# Patient Record
Sex: FEMALE | Race: BLACK OR AFRICAN AMERICAN | Employment: FULL TIME | URBAN - METROPOLITAN AREA
[De-identification: names, ages, dates, MRNs, and addresses within clinical notes are randomized per-mention and may not be internally consistent; named-entity substitution may affect disease eponyms.]

---

## 2020-05-23 ENCOUNTER — ED HISTORICAL/CONVERTED ENCOUNTER (OUTPATIENT)
Dept: OTHER | Age: 50
End: 2020-05-23

## 2020-08-11 ENCOUNTER — TELEPHONE (OUTPATIENT)
Dept: ENT CLINIC | Age: 50
End: 2020-08-11

## 2020-08-13 DIAGNOSIS — R59.0 CERVICAL LYMPHADENOPATHY: Primary | ICD-10-CM

## 2021-02-15 PROBLEM — Z87.42 HISTORY OF ENDOMETRIOSIS: Status: ACTIVE | Noted: 2021-02-15

## 2021-02-15 PROBLEM — B20 HUMAN IMMUNODEFICIENCY VIRUS (HCC): Status: ACTIVE | Noted: 2021-02-15

## 2021-02-15 RX ORDER — ERGOCALCIFEROL 1.25 MG/1
50000 CAPSULE ORAL
COMMUNITY

## 2021-06-29 ENCOUNTER — HOSPITAL ENCOUNTER (EMERGENCY)
Age: 51
Discharge: HOME | End: 2021-06-30
Attending: EMERGENCY MEDICINE
Payer: COMMERCIAL

## 2021-06-29 DIAGNOSIS — M54.2 NECK PAIN: Primary | ICD-10-CM

## 2021-06-29 DIAGNOSIS — N39.0 URINARY TRACT INFECTION WITHOUT HEMATURIA, SITE UNSPECIFIED: ICD-10-CM

## 2021-06-29 PROCEDURE — 74011250637 HC RX REV CODE- 250/637: Performed by: EMERGENCY MEDICINE

## 2021-06-29 PROCEDURE — 81001 URINALYSIS AUTO W/SCOPE: CPT

## 2021-06-29 PROCEDURE — 99284 EMERGENCY DEPT VISIT MOD MDM: CPT

## 2021-06-29 RX ORDER — METOPROLOL TARTRATE 50 MG/1
50 TABLET ORAL
Status: COMPLETED | OUTPATIENT
Start: 2021-06-29 | End: 2021-06-29

## 2021-06-29 RX ORDER — LORAZEPAM 1 MG/1
1 TABLET ORAL
COMMUNITY

## 2021-06-29 RX ORDER — CYCLOBENZAPRINE HCL 10 MG
10 TABLET ORAL
Status: COMPLETED | OUTPATIENT
Start: 2021-06-29 | End: 2021-06-29

## 2021-06-29 RX ORDER — IBUPROFEN 800 MG/1
800 TABLET ORAL
Status: COMPLETED | OUTPATIENT
Start: 2021-06-29 | End: 2021-06-29

## 2021-06-29 RX ORDER — FLUOXETINE HYDROCHLORIDE 20 MG/1
CAPSULE ORAL
COMMUNITY
Start: 2020-10-26

## 2021-06-29 RX ADMIN — IBUPROFEN 800 MG: 800 TABLET, FILM COATED ORAL at 23:59

## 2021-06-29 RX ADMIN — CYCLOBENZAPRINE HYDROCHLORIDE 10 MG: 10 TABLET, FILM COATED ORAL at 23:59

## 2021-06-29 RX ADMIN — METOPROLOL TARTRATE 50 MG: 50 TABLET, FILM COATED ORAL at 23:59

## 2021-06-29 NOTE — Clinical Note
[address]  [facility] EMERGENCY DEPT  [address] [id number]  [phone]    Work/School Note    Date: 6/29/2021    To Whom It May concern:    Karen Plaza was seen and treated today in the emergency room by the following provider(s):  Attending Provider: Ty Leung MD.      Karen Plaza is excused from work/school on 06/30/21 and 07/01/21. She is medically clear to return to work/school on 7/2/2021.        Sincerely,          Robert Messer MD

## 2021-06-30 VITALS
WEIGHT: 137 LBS | SYSTOLIC BLOOD PRESSURE: 162 MMHG | TEMPERATURE: 98.5 F | BODY MASS INDEX: 24.27 KG/M2 | HEART RATE: 77 BPM | DIASTOLIC BLOOD PRESSURE: 92 MMHG | OXYGEN SATURATION: 100 % | RESPIRATION RATE: 14 BRPM | HEIGHT: 63 IN

## 2021-06-30 LAB
APPEARANCE UR: ABNORMAL
BACTERIA URNS QL MICRO: ABNORMAL /HPF
BILIRUB UR QL: NEGATIVE
COLOR UR: ABNORMAL
GLUCOSE UR STRIP.AUTO-MCNC: NEGATIVE MG/DL
HGB UR QL STRIP: ABNORMAL
KETONES UR QL STRIP.AUTO: NEGATIVE MG/DL
LEUKOCYTE ESTERASE UR QL STRIP.AUTO: ABNORMAL
NITRITE UR QL STRIP.AUTO: POSITIVE
PH UR STRIP: 6 [PH] (ref 5–8)
PROT UR STRIP-MCNC: ABNORMAL MG/DL
RBC #/AREA URNS HPF: ABNORMAL /HPF (ref 0–3)
SP GR UR REFRACTOMETRY: 1.02 (ref 1–1.03)
UROBILINOGEN UR QL STRIP.AUTO: 0.2 EU/DL (ref 0.2–1)
WBC URNS QL MICRO: ABNORMAL /HPF (ref 0–5)

## 2021-06-30 PROCEDURE — 74011250637 HC RX REV CODE- 250/637: Performed by: EMERGENCY MEDICINE

## 2021-06-30 RX ORDER — CYCLOBENZAPRINE HCL 10 MG
10 TABLET ORAL
Qty: 20 TABLET | Refills: 0 | Status: SHIPPED | OUTPATIENT
Start: 2021-06-30

## 2021-06-30 RX ORDER — LEVOFLOXACIN 500 MG/1
500 TABLET, FILM COATED ORAL
Status: COMPLETED | OUTPATIENT
Start: 2021-06-30 | End: 2021-06-30

## 2021-06-30 RX ORDER — IBUPROFEN 800 MG/1
800 TABLET ORAL
Qty: 20 TABLET | Refills: 0 | Status: SHIPPED | OUTPATIENT
Start: 2021-06-30 | End: 2021-07-07

## 2021-06-30 RX ORDER — LEVOFLOXACIN 500 MG/1
500 TABLET, FILM COATED ORAL DAILY
Qty: 7 TABLET | Refills: 0 | Status: SHIPPED | OUTPATIENT
Start: 2021-06-30

## 2021-06-30 RX ADMIN — LEVOFLOXACIN 500 MG: 500 TABLET, FILM COATED ORAL at 00:54

## 2021-06-30 NOTE — ED NOTES
Pt discharged w/ d/c paperwork, prescriptions, and work note. No further questions or complaints at this time. Pt ambulates out of ER without difficulty.

## 2021-06-30 NOTE — ED PROVIDER NOTES
Patient presents with complaint of right sided neck pain and spasm , after throwing boxes at work today. Pain is worse wit movement. She also complains of painful urination. No chest pain, abdominal pain, fever or chills. Duration:  1 day    Intensity: 5/10    Modified by: movement or palpation. Past Medical History:   Diagnosis Date    AIDS (acquired immune deficiency syndrome) ([facility] [address]     History of endometriosis 2/15/2021    [facility] 2/15/2021    Hypertension        No past surgical history on file. History reviewed. No pertinent family history. Social History     Socioeconomic History    Marital status:      Spouse name: Not on file    Number of children: Not on file    Years of education: Not on file    Highest education level: Not on file   Occupational History    Not on file   Tobacco Use    Smoking status: Never Smoker    Smokeless tobacco: Never Used   Substance and Sexual Activity    Alcohol use: Not on file    Drug use: Not on file    Sexual activity: Not on file   Other Topics Concern    Not on file   Social History Narrative    Not on file     Social Determinants of Health     Financial Resource Strain:     Difficulty of Paying Living Expenses:    Food Insecurity:     Worried About Running Out of Food in the Last Year:     [address] in the Last Year:    Transportation Needs:     Lack of Transportation (Medical):      Lack of Transportation (Non-Medical):    Physical Activity:     Days of Exercise per Week:     Minutes of Exercise per Session:    Stress:     Feeling of Stress :    Social Connections:     Frequency of Communication with Friends and Family:     Frequency of Social Gatherings with Friends and Family:     Attends Christian Services:     Active Member of Clubs or Organizations:     Attends Club or Organization Meetings:     Marital Status:    Intimate Partner Violence:     Fear of Current or Ex-Partner:     Emotionally Abused:     Physically Abused:     Sexually Abused: ALLERGIES: Bactrim [sulfamethoprim], Flagyl [metronidazole], Morphine, and Stribild [elviteg-cob-emtri-tenofo disop]    Review of Systems   Constitutional: Negative. Negative for fever. HENT: Negative. Eyes: Negative. Respiratory: Negative. Negative for shortness of breath. Cardiovascular: Negative. Negative for chest pain. Gastrointestinal: Negative. Negative for abdominal pain. Endocrine: Negative. Genitourinary: Positive for dysuria. Musculoskeletal: Positive for neck pain. Skin: Negative. Allergic/Immunologic: Negative. Neurological: Negative. Hematological: Negative. Psychiatric/Behavioral: Negative. All other systems reviewed and are negative. Vitals:    06/29/21 2332   BP: (!) 180/110   Pulse: 77   Resp: 14   Temp: 98.5 °F (36.9 °C)   SpO2: 100%   Weight: 62.1 kg (137 lb)   Height: 5' 3\" (1.6 m)            Physical Exam  Vitals and nursing note reviewed. Constitutional:       Appearance: She is well-developed. HENT:      Head: Normocephalic and atraumatic. Nose: Nose normal.   Eyes:      Pupils: Pupils are equal, round, and reactive to light. Neck:      Comments: Right side and right posterior neck tender to palpation. Cardiovascular:      Rate and Rhythm: Normal rate and regular rhythm. Heart sounds: Normal heart sounds. Pulmonary:      Breath sounds: Normal breath sounds. Abdominal:      General: Bowel sounds are normal.      Palpations: Abdomen is soft. Musculoskeletal:         General: Normal range of motion. Cervical back: Normal range of motion and neck supple. Tenderness present. No rigidity. Neurological:      General: No focal deficit present. Mental Status: She is alert. Motor: No weakness.       Gait: Gait normal.   Psychiatric:         Mood and Affect: Mood normal.         Behavior: Behavior normal.          MDM  Number of Diagnoses or Management Options  Risk of Complications, Morbidity, and/or Mortality  Presenting problems: low  Diagnostic procedures: low  Management options: low    Patient Progress  Patient progress: improved         Procedures

## 2021-06-30 NOTE — ED TRIAGE NOTES
Patient presents to ED reporting she pulled a muscle in the right side of her neck today at work while [employer]. Patient reports she applied a heating pad and muscle relief cream without relief of pain. Patient denies all other complaints at this time.

## 2022-03-19 PROBLEM — Z87.42 HISTORY OF ENDOMETRIOSIS: Status: ACTIVE | Noted: 2021-02-15

## 2022-03-19 PROBLEM — B20 HUMAN IMMUNODEFICIENCY VIRUS (HCC): Status: ACTIVE | Noted: 2021-02-15

## 2022-07-16 ENCOUNTER — HOSPITAL ENCOUNTER (EMERGENCY)
Age: 52
Discharge: HOME | End: 2022-07-17
Attending: EMERGENCY MEDICINE
Payer: COMMERCIAL

## 2022-07-16 VITALS
WEIGHT: 135.8 LBS | RESPIRATION RATE: 20 BRPM | BODY MASS INDEX: 24.06 KG/M2 | HEIGHT: 63 IN | DIASTOLIC BLOOD PRESSURE: 89 MMHG | OXYGEN SATURATION: 100 % | TEMPERATURE: 98.3 F | SYSTOLIC BLOOD PRESSURE: 143 MMHG | HEART RATE: 85 BPM

## 2022-07-16 DIAGNOSIS — L02.91 ABSCESS: Primary | ICD-10-CM

## 2022-07-16 PROCEDURE — 99283 EMERGENCY DEPT VISIT LOW MDM: CPT

## 2022-07-16 PROCEDURE — 74011000250 HC RX REV CODE- 250: Performed by: EMERGENCY MEDICINE

## 2022-07-16 PROCEDURE — 75810000117 HC INC/DRN VULVA/PERINEUM

## 2022-07-16 RX ORDER — CEPHALEXIN 500 MG/1
500 CAPSULE ORAL 3 TIMES DAILY
Qty: 21 CAPSULE | Refills: 0 | Status: SHIPPED | OUTPATIENT
Start: 2022-07-16 | End: 2022-07-17

## 2022-07-16 RX ORDER — LIDOCAINE HYDROCHLORIDE 20 MG/ML
0.3 INJECTION, SOLUTION INFILTRATION; PERINEURAL ONCE
Status: COMPLETED | OUTPATIENT
Start: 2022-07-16 | End: 2022-07-16

## 2022-07-16 RX ADMIN — LIDOCAINE HYDROCHLORIDE 6 MG: 20 INJECTION, SOLUTION INFILTRATION; PERINEURAL at 23:01

## 2022-07-16 NOTE — Clinical Note
[address] [facility] EMERGENCY DEPT  [phone]  [phone]    Work/School Note    Date: 7/16/2022    To Whom It May concern:    Felix Schofield was seen and treated today in the emergency room by the following provider(s):  Attending Provider: Samantha Honeycutt MD.      Felix Schofield is excused from work/school on 7/16/2022 through 7/18/2022. She is medically clear to return to work/school on 7/19/2022.          Sincerely,          Clarissa Mott MD

## 2022-07-16 NOTE — Clinical Note
[address] [facility] EMERGENCY DEPT  [id number]  [phone]    Work/School Note    Date: 7/16/2022    To Whom It May concern:    Bladimir Rojas was seen and treated today in the emergency room by the following provider(s):  Attending Provider: Therese Paniagua MD.      Bladimir Rojas is excused from work/school on 7/16/2022 through 7/18/2022. She is medically clear to return to work/school on 7/19/2022.          Sincerely,          Dave Landa MD

## 2022-07-16 NOTE — Clinical Note
[address] [facility] EMERGENCY DEPT  [phone]  [phone]    Work/School Note    Date: 7/16/2022    To Whom It May concern:    Eduar Bryson was seen and treated today in the emergency room by the following provider(s):  Attending Provider: Luis Ricketts MD.      Eduar Bryson is excused from work/school on 7/16/2022 through 7/18/2022. She is medically clear to return to work/school on 7/19/2022.          Sincerely,          Ethel Thacker

## 2022-07-17 RX ORDER — CEPHALEXIN 500 MG/1
500 CAPSULE ORAL 3 TIMES DAILY
Qty: 21 CAPSULE | Refills: 0 | Status: SHIPPED | OUTPATIENT
Start: 2022-07-17 | End: 2022-07-24

## 2022-07-17 NOTE — DISCHARGE INSTRUCTIONS
Keep area dry and clean. Follow-up at primary doctor in 48 hours for evaluation. Or return ED for further evaluation. Motrin or tylenol for pain.

## 2022-07-17 NOTE — ED PROVIDER NOTES
EMERGENCY DEPARTMENT HISTORY AND PHYSICAL EXAM      Date: 7/16/2022  Patient Name: Edelmira Villegas    History of Presenting Illness     Chief Complaint   Patient presents with    Skin Problem       History Provided By: Patient    HPI: Edelmira Villegas, 50 y.o. female with a significant past medical history of HIV, depression and HTN presents to the ED with skin problems, abscess in the vulval area for 2 days. The abscess on the left buttock ruptured yesterday. No hx of abscess. She denies fevers, and chills. Patient didn't take any medication for pain. There are no other complaints, changes, or physical findings at this time. PCP: Renee Serna PA-C    No current facility-administered medications on file prior to encounter. Current Outpatient Medications on File Prior to Encounter   Medication Sig Dispense Refill    cyclobenzaprine (FLEXERIL) 10 mg tablet Take 1 Tablet by mouth three (3) times daily as needed for Muscle Spasm(s). 20 Tablet 0    levoFLOXacin (Levaquin) 500 mg tablet Take 1 Tablet by mouth daily. 7 Tablet 0    dolutegravir (TIVICAY) 50 mg tab tablet 50 mg = 1 tab each dose, PO, daily, # 30 EA, 5 Refills, Pharmacy: [facility]/pharmacy #[id number]      emtricitabine-tenofovir alafen (DESCOVY) tablet 1 tab, PO, daily, 5 Refills, Dispense: 30, EA, Pharmacy [facility]/pharmacy #[id number]      FLUoxetine (PROzac) 20 mg capsule 20 mg = 1 CAP each dose, PO, daily, # 90 CAP, 1 Refills, Pharmacy: [facility]/pharmacy #[id number]     LORazepam (ATIVAN) 1 mg tablet Take 1 mg by mouth every eight (8) hours as needed for Anxiety.  AMLODIPINE BESYLATE, BULK, by Does Not Apply route.  ergocalciferol (Vitamin D2) 1,250 mcg (50,000 unit) capsule Take 50,000 Units by mouth.          Past History     Past Medical History:  Past Medical History:   Diagnosis Date    AIDS (acquired immune deficiency syndrome) ([facility] [address]     History of endometriosis 2/15/2021    Human immunodeficiency virus ([facility] [address] 2/15/2021    Hypertension        Past Surgical History:  No past surgical history on file. Family History:  No family history on file. Social History:  Social History     Tobacco Use    Smoking status: Never Smoker    Smokeless tobacco: Never Used   Substance Use Topics    Alcohol use: Not on file    Drug use: Not on file       Allergies: Allergies   Allergen Reactions    Bactrim [Sulfamethoprim] Not Reported This Time    Flagyl [Metronidazole] Rash    Morphine Not Reported This Time    Stribild [Elviteg-Cob-Emtri-Tenofo Disop] Anxiety and Drowsiness       Review of Systems   Review of Systems   Constitutional: Negative. HENT: Negative. Eyes: Negative. Respiratory: Negative. Cardiovascular: Negative. Gastrointestinal: Negative. Endocrine: Negative. Genitourinary: Negative. Large abscess below the left vulval area with point pointing, swelling and reddness   Musculoskeletal: Negative. Skin: Negative. Allergic/Immunologic: Negative. Neurological: Negative. Hematological: Negative. Psychiatric/Behavioral: Negative. Physical Exam   Physical Exam  Vitals and nursing note reviewed. Constitutional:       Appearance: Normal appearance. HENT:      Head: Normocephalic. Right Ear: Tympanic membrane normal.      Left Ear: Tympanic membrane normal.      Nose: Nose normal.      Mouth/Throat:      Mouth: Mucous membranes are moist.   Eyes:      Pupils: Pupils are equal, round, and reactive to light. Cardiovascular:      Rate and Rhythm: Normal rate. Pulses: Normal pulses. Pulmonary:      Effort: Pulmonary effort is normal.   Abdominal:      General: Abdomen is flat. Palpations: Abdomen is soft. Genitourinary:         Comments: Large raise abscess with pointing. Musculoskeletal:         General: Normal range of motion. Cervical back: Normal range of motion and neck supple. Skin:     Capillary Refill: Capillary refill takes less than 2 seconds.    Neurological:      General: No focal deficit present. Mental Status: She is alert. Psychiatric:         Mood and Affect: Mood normal.         Lab and Diagnostic Study Results   Labs -   No results found for this or any previous visit (from the past 12 hour(s)). Radiologic Studies -   @lastxrresult@  CT Results  (Last 48 hours)    None        CXR Results  (Last 48 hours)    None          Medical Decision Making and ED Course   - I am the first provider for this patient. I reviewed the vital signs, available nursing notes, past medical history, past surgical history, family history and social history. - Initial assessment performed. The patients presenting problems have been discussed, and they are in agreement with the care plan formulated and outlined with them. I have encouraged them to ask questions as they arise throughout their visit. Vital Signs-Reviewed the patient's vital signs. No data found. Differential Diagnosis & Medical Decision Making Provider Note: abscess, cellulitis, trauma  MDM     ED Course:        Procedures   Performed by: Mela Cutler MD  I&D Abcess Complex    Date/Time: 7/17/2022 12:14 AM  Performed by: Heather Barnes MD  Authorized by: Heather Barnes MD     Consent:     Consent obtained:  Verbal    Consent given by:  Patient    Alternatives discussed:  No treatment  Location:     Type:  Abscess    Location:  Anogenital    Anogenital location:  Vulva  Pre-procedure details:     Skin preparation:  Antiseptic wash and Betadine  Procedure type:     Complexity:  Complex  Procedure details:     Incision types:  Stab incision    Scalpel blade:  15    Wound management:  Probed and deloculated    Drainage:  Bloody and purulent    Drainage amount: Moderate    Wound treatment:  Drain placed    Packing materials:  1/4 in gauze  Post-procedure details:     Patient tolerance of procedure:   Tolerated well, no immediate complications         Disposition   Disposition: Condition stable  DC- Adult Discharges: All of the diagnostic tests were reviewed and questions answered. Diagnosis, care plan and treatment options were discussed. The patient understands the instructions and will follow up as directed. The patients results have been reviewed with them. They have been counseled regarding their diagnosis. The patient verbally convey understanding and agreement of the signs, symptoms, diagnosis, treatment and prognosis and additionally agrees to follow up as recommended with their PCP in 24 - 48 hours. They also agree with the care-plan and convey that all of their questions have been answered. I have also put together some discharge instructions for them that include: 1) educational information regarding their diagnosis, 2) how to care for their diagnosis at home, as well a 3) list of reasons why they would want to return to the ED prior to their follow-up appointment, should their condition change. DISCHARGE PLAN:  1. Current Discharge Medication List      CONTINUE these medications which have NOT CHANGED    Details   cyclobenzaprine (FLEXERIL) 10 mg tablet Take 1 Tablet by mouth three (3) times daily as needed for Muscle Spasm(s). Qty: 20 Tablet, Refills: 0      levoFLOXacin (Levaquin) 500 mg tablet Take 1 Tablet by mouth daily. Qty: 7 Tablet, Refills: 0      dolutegravir (TIVICAY) 50 mg tab tablet 50 mg = 1 tab each dose, PO, daily, # 30 EA, 5 Refills, Pharmacy: [facility]/pharmacy #[id number]      emtricitabine-tenofovir alafen (DESCOVY) tablet 1 tab, PO, daily, 5 Refills, Dispense: 30, EA, Pharmacy [facility]/pharmacy #[id number]     FLUoxetine (PROzac) 20 mg capsule 20 mg = 1 CAP each dose, PO, daily, # 90 CAP, 1 Refills, Pharmacy: [facility]/pharmacy #[id number]     LORazepam (ATIVAN) 1 mg tablet Take 1 mg by mouth every eight (8) hours as needed for Anxiety. AMLODIPINE BESYLATE, BULK, by Does Not Apply route.      ergocalciferol (Vitamin D2) 1,250 mcg (50,000 unit) capsule Take 50,000 Units by mouth.            2. Follow-up Information    None       3. Return to ED if worse   4. Current Discharge Medication List        Remove if admitted/transferred    Diagnosis/Clinical Impression     Clinical Impression:     ICD-10-CM ICD-9-CM    1. Abscess  L02.91 682.9     left vular area             Attestations: Grayson Flanagan MD    Please note that this dictation was completed with [employer], the computer voice recognition software. Quite often unanticipated grammatical, syntax, homophones, and other interpretive errors are inadvertently transcribed by the computer software. Please disregard these errors. Please excuse any errors that have escaped final proofreading. Thank you.

## 2023-03-23 ENCOUNTER — HOSPITAL ENCOUNTER (EMERGENCY)
Age: 53
Discharge: HOME | End: 2023-03-23
Attending: EMERGENCY MEDICINE
Payer: COMMERCIAL

## 2023-03-23 VITALS
DIASTOLIC BLOOD PRESSURE: 93 MMHG | SYSTOLIC BLOOD PRESSURE: 156 MMHG | OXYGEN SATURATION: 100 % | TEMPERATURE: 97.7 F | RESPIRATION RATE: 19 BRPM | HEART RATE: 71 BPM | HEIGHT: 63 IN | WEIGHT: 135 LBS | BODY MASS INDEX: 23.92 KG/M2

## 2023-03-23 DIAGNOSIS — N89.8 VAGINAL DISCHARGE: Primary | ICD-10-CM

## 2023-03-23 DIAGNOSIS — Z20.2 POSSIBLE EXPOSURE TO STD: ICD-10-CM

## 2023-03-23 LAB
APPEARANCE UR: CLEAR
BACTERIA URNS QL MICRO: ABNORMAL /HPF
BILIRUB UR QL: NEGATIVE
CLUE CELLS VAG QL WET PREP: NEGATIVE
COLOR UR: ABNORMAL
GLUCOSE UR STRIP.AUTO-MCNC: NEGATIVE MG/DL
HGB UR QL STRIP: ABNORMAL
KETONES UR QL STRIP.AUTO: NEGATIVE MG/DL
KOH PREP SPEC: NORMAL
LEUKOCYTE ESTERASE UR QL STRIP.AUTO: NEGATIVE
NITRITE UR QL STRIP.AUTO: NEGATIVE
PH UR STRIP: 7 (ref 5–8)
PROT UR STRIP-MCNC: NEGATIVE MG/DL
RBC #/AREA URNS HPF: ABNORMAL /HPF (ref 0–3)
SP GR UR REFRACTOMETRY: 1.01 (ref 1–1.03)
SPECIAL REQUESTS,SREQ: NORMAL
T VAGINALIS VAG QL WET PREP: NEGATIVE
UA: UC IF INDICATED,UAUC: ABNORMAL
UROBILINOGEN UR QL STRIP.AUTO: 1 EU/DL (ref 0.2–1)
WBC URNS QL MICRO: ABNORMAL /HPF (ref 0–5)

## 2023-03-23 PROCEDURE — 74011250637 HC RX REV CODE- 250/637: Performed by: EMERGENCY MEDICINE

## 2023-03-23 PROCEDURE — 74011000250 HC RX REV CODE- 250: Performed by: EMERGENCY MEDICINE

## 2023-03-23 PROCEDURE — 87210 SMEAR WET MOUNT SALINE/INK: CPT

## 2023-03-23 PROCEDURE — 81001 URINALYSIS AUTO W/SCOPE: CPT

## 2023-03-23 PROCEDURE — 74011250636 HC RX REV CODE- 250/636: Performed by: EMERGENCY MEDICINE

## 2023-03-23 PROCEDURE — 99284 EMERGENCY DEPT VISIT MOD MDM: CPT | Performed by: EMERGENCY MEDICINE

## 2023-03-23 PROCEDURE — 87491 CHLMYD TRACH DNA AMP PROBE: CPT

## 2023-03-23 PROCEDURE — 96372 THER/PROPH/DIAG INJ SC/IM: CPT | Performed by: EMERGENCY MEDICINE

## 2023-03-23 RX ORDER — DOXYCYCLINE HYCLATE 100 MG
100 TABLET ORAL 2 TIMES DAILY
Qty: 14 TABLET | Refills: 0 | Status: SHIPPED | OUTPATIENT
Start: 2023-03-23 | End: 2023-03-30

## 2023-03-23 RX ORDER — DOXYCYCLINE 100 MG/1
100 CAPSULE ORAL
Status: COMPLETED | OUTPATIENT
Start: 2023-03-23 | End: 2023-03-23

## 2023-03-23 RX ADMIN — LIDOCAINE HYDROCHLORIDE 500 MG: 10 INJECTION, SOLUTION EPIDURAL; INFILTRATION; INTRACAUDAL; PERINEURAL at 18:56

## 2023-03-23 RX ADMIN — DOXYCYCLINE 100 MG: 100 CAPSULE ORAL at 18:56

## 2023-03-23 NOTE — ED PROVIDER NOTES
[facility] EMERGENCY DEPT  EMERGENCY DEPARTMENT ENCOUNTER       Pt Name: Maria Luisa Obregon  MRN: [id number]  [place] 1970  Date of evaluation: 3/23/2023  Provider: Nusrat Camejo MD   PCP: Jennifer Hyde PA-C  Note Started: 6:27 PM 3/23/23     CHIEF COMPLAINT       Chief Complaint   Patient presents with    Pelvic Pain        HISTORY OF PRESENT ILLNESS: 1 or more elements      History From: Patient  HPI Limitations : None     Maria Luisa Obregon is a 48 y.o. female who presents with burning with urination and pelvic pain and vaginal discharge (creamy white) for the past two days. No rashes or lesions. + concern for sti. Nursing Notes were all reviewed and agreed with or any disagreements were addressed in the HPI. REVIEW OF SYSTEMS      Review of Systems   Constitutional:  Negative for appetite change and fever. HENT: Negative. Eyes: Negative. Respiratory: Negative. Negative for shortness of breath. Gastrointestinal:  Negative for abdominal pain and nausea. Genitourinary:  Positive for pelvic pain, urgency and vaginal discharge. Negative for decreased urine volume, flank pain, hematuria, vaginal bleeding and vaginal pain. Musculoskeletal:  Positive for arthralgias, joint swelling and myalgias. Skin:  Negative for color change, rash and wound. Neurological: Negative. Negative for weakness and numbness. Psychiatric/Behavioral:  The patient is not nervous/anxious. Positives and Pertinent negatives as per HPI. PAST HISTORY     Past Medical History:  Past Medical History:   Diagnosis Date    AIDS (acquired immune deficiency syndrome) ([facility] [address]     History of endometriosis 2/15/2021    Human immunodeficiency virus ([address] 2/15/2021    Hypertension        Past Surgical History:  No past surgical history on file. Family History:  History reviewed. No pertinent family history.     Social History:  Social History     Tobacco Use    Smoking status: Never    Smokeless tobacco: Never Allergies: Allergies   Allergen Reactions    Bactrim [Sulfamethoprim] Not Reported This Time    Flagyl [Metronidazole] Rash    Morphine Not Reported This Time    Stribild [Elviteg-Cob-Emtri-Tenofo Disop] Anxiety and Drowsiness       CURRENT MEDICATIONS      Previous Medications    AMLODIPINE BESYLATE, BULK,    by Does Not Apply route. CEPHALEXIN (KEFLEX) 500 MG CAPSULE    Take 1 Capsule by mouth three (3) times daily for 7 days. Indications: an infection of the skin and the tissue below the skin    CYCLOBENZAPRINE (FLEXERIL) 10 MG TABLET    Take 1 Tablet by mouth three (3) times daily as needed for Muscle Spasm(s). DOLUTEGRAVIR (TIVICAY) 50 MG TAB TABLET    50 mg = 1 tab each dose, PO, daily, # 30 EA, 5 Refills, Pharmacy: [facility]/pharmacy #[id number]   EMTRICITABINE-TENOFOVIR ALAFEN (DESCOVY) TABLET    1 tab, PO, daily, 5 Refills, Dispense: 30, EA, Pharmacy [facility]/pharmacy #[id number]   ERGOCALCIFEROL (VITAMIN D2) 1,250 MCG (50,000 UNIT) CAPSULE    Take 50,000 Units by mouth. FLUOXETINE (PROZAC) 20 MG CAPSULE    20 mg = 1 CAP each dose, PO, daily, # 90 CAP, 1 Refills, Pharmacy: [facility]/pharmacy #[id number]   LEVOFLOXACIN (LEVAQUIN) 500 MG TABLET    Take 1 Tablet by mouth daily. LORAZEPAM (ATIVAN) 1 MG TABLET    Take 1 mg by mouth every eight (8) hours as needed for Anxiety. SCREENINGS               No data recorded         PHYSICAL EXAM      ED Triage Vitals [03/23/23 1741]   ED Encounter Vitals Group      BP (!) 156/93      Pulse (Heart Rate) 71      Resp Rate 19      Temp 97.7 °F (36.5 °C)      Temp src       O2 Sat (%) 100 %      Weight 135 lb      Height 5' 3\"        Physical Exam  Vitals and nursing note reviewed. Constitutional:       General: She is not in acute distress. Appearance: Normal appearance. She is normal weight. She is not ill-appearing. HENT:      Head: Normocephalic and atraumatic.       Right Ear: External ear normal.      Left Ear: External ear normal.      Nose: Nose normal. No congestion. Mouth/Throat:      Mouth: Mucous membranes are moist.      Pharynx: Oropharynx is clear. Eyes:      Conjunctiva/sclera: Conjunctivae normal.      Pupils: Pupils are equal, round, and reactive to light. Cardiovascular:      Rate and Rhythm: Normal rate and regular rhythm. Pulses: Normal pulses. Heart sounds: Normal heart sounds. No murmur heard. Pulmonary:      Effort: Pulmonary effort is normal. No respiratory distress. Breath sounds: Normal breath sounds. Abdominal:      General: Abdomen is flat. Bowel sounds are normal. There is no distension. Palpations: Abdomen is soft. Tenderness: There is no abdominal tenderness. There is no guarding. Genitourinary:     Comments: Discussed pelvic, pt declines. Musculoskeletal:         General: No tenderness. Normal range of motion. Cervical back: Neck supple. Skin:     General: Skin is warm and dry. Capillary Refill: Capillary refill takes less than 2 seconds. Findings: No bruising, lesion or rash. Neurological:      General: No focal deficit present. Mental Status: She is alert and oriented to person, place, and time. Mental status is at baseline. Cranial Nerves: No cranial nerve deficit. Sensory: No sensory deficit. Motor: No weakness. Psychiatric:         Mood and Affect: Mood normal.         Behavior: Behavior normal.         Thought Content:  Thought content normal.         Judgment: Judgment normal.           DIAGNOSTIC RESULTS   LABS:     Recent Results (from the past 12 hour(s))   URINALYSIS W/ REFLEX CULTURE    Collection Time: 03/23/23  5:44 PM    Specimen: Urine   Result Value Ref Range    Color Yellow/Straw      Appearance Clear Clear      Specific gravity 1.015 1.003 - 1.030      pH (UA) 7.0 5.0 - 8.0      Protein Negative Negative mg/dL    Glucose Negative Negative mg/dL    Ketone Negative Negative mg/dL    Bilirubin Negative Negative      Blood Moderate (A) Negative Urobilinogen 1.0 0.2 - 1.0 EU/dL    Nitrites Negative Negative      Leukocyte Esterase Negative Negative      WBC 0-4 0 - 5 /hpf    RBC 20-50 0 - 3 /hpf    Bacteria 1+ (A) Negative /hpf    UA:UC IF INDICATED Culture not indicated by UA result Culture not indicated by UA result           No results found. PROCEDURES   Unless otherwise noted below, none  Procedures     CRITICAL CARE TIME   Not met    EMERGENCY DEPARTMENT COURSE and DIFFERENTIAL DIAGNOSIS/MDM   Vitals:    Vitals:    03/23/23 1741   BP: (!) 156/93   Pulse: 71   Resp: 19   Temp: 97.7 °F (36.5 °C)   SpO2: 100%   Weight: 61.2 kg (135 lb)   Height: 5' 3\" (1.6 m)        Patient was given the following medications:  Medications - No data to display    CONSULTS: (Who and What was discussed)  None    Chronic Conditions:   Past Medical History:   Diagnosis Date    AIDS (acquired immune deficiency syndrome) ([facility]     History of endometriosis 2/15/2021    Human immunodeficiency virus ([facility] 2/15/2021    Hypertension         Social Determinants affecting Dx or Tx: None    Records Reviewed (source and summary of external notes): None    CC/HPI Summary, DDx, ED Course, and Reassessment: pt arrives with lower abdominal discomfort , vaginal itching and discharge associated with dysuria. Concerned for possible sti exposure. She prefers to self swab for collection. We discusssed empiric treatmetn and she would liek to proceed. Disposition Considerations (Tests not done, Shared Decision Making, Pt Expectation of Test or Tx.): Considered Pelvic and US but pt declines. Not likely ovarian torsino or other tiology and she prefers to self swab. Also considered ct imaging to look for kidney stone but history suggests this is less likely. FINAL IMPRESSION     1. Vaginal discharge    2. Possible exposure to STD          DISPOSITION/PLAN       Discharge Note: The patient is stable for discharge home.  The signs, symptoms, diagnosis, and discharge instructions have been discussed, understanding conveyed, and agreed upon. The patient is to follow up as recommended or return to ER should their symptoms worsen. PATIENT REFERRED TO:  Follow-up Information       Follow up With Specialties Details Why Contact Info    Sammy Hernandez PA-C Physician Assistant Go in 3 days  [address] [place] [address]  [phone]      [facility] EMERGENCY DEPT Emergency Medicine Go to  As needed, or for any concerns or deteriorations. , if symptoms persist or worsen. [address] [facility]  [phone]              DISCHARGE MEDICATIONS:  Discharge Medication List as of 3/23/2023  7:40 PM        START taking these medications    Details   doxycycline (VIBRA-TABS) 100 mg tablet Take 1 Tablet by mouth two (2) times a day for 7 days. , Normal, Disp-14 Tablet, R-0           CONTINUE these medications which have NOT CHANGED    Details   cephALEXin (Keflex) 500 mg capsule Take 1 Capsule by mouth three (3) times daily for 7 days. Indications: an infection of the skin and the tissue below the skin, Normal, Disp-21 Capsule, R-0      cyclobenzaprine (FLEXERIL) 10 mg tablet Take 1 Tablet by mouth three (3) times daily as needed for Muscle Spasm(s). , Print, Disp-20 Tablet, R-0      levoFLOXacin (Levaquin) 500 mg tablet Take 1 Tablet by mouth daily. , Print, Disp-7 Tablet, R-0      dolutegravir (TIVICAY) 50 mg tab tablet 50 mg = 1 tab each dose, PO, daily, # 30 EA, 5 Refills, Pharmacy: [facility]/pharmacy #[id number] Historical Med      emtricitabine-tenofovir alafen (DESCOVY) tablet 1 tab, PO, daily, 5 Refills, Dispense: 30, EA, Pharmacy [facility]/pharmacy #[id number], Historical Med      FLUoxetine (PROzac) 20 mg capsule 20 mg = 1 CAP each dose, PO, daily, # 90 CAP, 1 Refills, Pharmacy: [facility]/pharmacy #[id number] Historical Med      LORazepam (ATIVAN) 1 mg tablet Take 1 mg by mouth every eight (8) hours as needed for Anxiety. , Historical Med      AMLODIPINE BESYLATE, BULK, by Does Not Apply route., Historical Med ergocalciferol (Vitamin D2) 1,250 mcg (50,000 unit) capsule Take 50,000 Units by mouth., Historical Med               DISCONTINUED MEDICATIONS:      I am the Primary Clinician of Record. Arthur Lawson MD (electronically signed)    (Please note that parts of this dictation were completed with voice recognition software. Quite often unanticipated grammatical, syntax, homophones, and other interpretive errors are inadvertently transcribed by the computer software. Please disregards these errors.  Please excuse any errors that have escaped final proofreading.)

## 2023-03-23 NOTE — Clinical Note
[address]  [facility] EMERGENCY DEPT  [place] [address] [id number]  [phone]    Work/School Note    Date: 3/23/2023    To Whom It May concern:    Shweta Medellin was seen and treated today in the emergency room by the following provider(s):  Attending Provider: Sheryl Conner MD.      Shweta Medellin is excused from work/school on 03/23/23 and 03/24/23. She is medically clear to return to work/school on 3/25/2023.        Sincerely,          Herberth Rock MD

## 2023-03-24 LAB
C TRACH DNA SPEC QL NAA+PROBE: NEGATIVE
N GONORRHOEA DNA SPEC QL NAA+PROBE: NEGATIVE
SAMPLE TYPE: NORMAL
SERVICE CMNT-IMP: NORMAL
SPECIMEN SOURCE: NORMAL

## 2023-09-25 ENCOUNTER — TRANSCRIBE ORDERS (OUTPATIENT)
Facility: HOSPITAL | Age: 53
End: 2023-09-25

## 2025-04-16 ENCOUNTER — TRANSCRIBE ORDERS (OUTPATIENT)
Facility: HOSPITAL | Age: 55
End: 2025-04-16

## 2025-04-16 DIAGNOSIS — R59.1 GENERALIZED ENLARGED LYMPH NODES: Primary | ICD-10-CM
